# Patient Record
Sex: MALE | Race: WHITE | Employment: PART TIME | URBAN - METROPOLITAN AREA
[De-identification: names, ages, dates, MRNs, and addresses within clinical notes are randomized per-mention and may not be internally consistent; named-entity substitution may affect disease eponyms.]

---

## 2018-05-07 ENCOUNTER — OFFICE VISIT (OUTPATIENT)
Dept: NEUROLOGY | Age: 53
End: 2018-05-07

## 2018-05-07 VITALS
DIASTOLIC BLOOD PRESSURE: 86 MMHG | HEIGHT: 67 IN | WEIGHT: 194.6 LBS | SYSTOLIC BLOOD PRESSURE: 130 MMHG | BODY MASS INDEX: 30.54 KG/M2 | OXYGEN SATURATION: 94 % | HEART RATE: 98 BPM | RESPIRATION RATE: 16 BRPM

## 2018-05-07 DIAGNOSIS — T39.95XA ANALGESIC OVERUSE HEADACHE: ICD-10-CM

## 2018-05-07 DIAGNOSIS — R06.83 SNORING: ICD-10-CM

## 2018-05-07 DIAGNOSIS — R51.9 CHRONIC DAILY HEADACHE: Primary | ICD-10-CM

## 2018-05-07 DIAGNOSIS — R51.9 MORNING HEADACHE: ICD-10-CM

## 2018-05-07 DIAGNOSIS — G44.40 ANALGESIC OVERUSE HEADACHE: ICD-10-CM

## 2018-05-07 RX ORDER — CITALOPRAM 40 MG/1
40 TABLET, FILM COATED ORAL DAILY
COMMUNITY
End: 2021-04-12

## 2018-05-07 RX ORDER — PRAZOSIN HYDROCHLORIDE 2 MG/1
2 CAPSULE ORAL
COMMUNITY
End: 2021-07-23 | Stop reason: ALTCHOICE

## 2018-05-07 RX ORDER — ATORVASTATIN CALCIUM 40 MG/1
TABLET, FILM COATED ORAL DAILY
COMMUNITY

## 2018-05-07 RX ORDER — METFORMIN HYDROCHLORIDE 500 MG/1
TABLET ORAL
COMMUNITY
End: 2019-04-29

## 2018-05-07 NOTE — PATIENT INSTRUCTIONS
Today we discussed medication overuse headache. You will stop all over-the-counter medications for headache including Advil. We discussed that headaches may get worse before they get better. Allow for 8-10 weeks medication free to assess true headache pattern. In the meantime, I will place order for sleep study.   You will follow-up with our sleep doctor if study is abnormal.
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
## 2018-05-07 NOTE — MR AVS SNAPSHOT
[address] [facility] 
 
 
 [address] [facility] 
[phone] Patient: Laura Crowell Peer MRN: [id number] :1965 Visit Information Date & Time Provider Department Dept. Phone Encounter #  
 2018  9:30 AM Roni Vidal  [facility] at [address] [place] [address] [id number] Follow-up Instructions Return for 8-10 weeks, after PSG with Dr. Lisa Bahena if abnormal. Upcoming Health Maintenance Date Due DTaP/Tdap/Td series (1 - Tdap) 1987 FOBT Q 1 YEAR AGE 50-75 2016 Influenza Age 5 to Adult 2018 Allergies as of 2018  Review Complete On: 2018 By: Makeda Laird LPN No Known Allergies Current Immunizations  Reviewed on 10/16/2015 Name Date Influenza Vaccine (Quad) PF 10/16/2015  1:02 PM  
  
 Not reviewed this visit You Were Diagnosed With   
  
 Codes Comments Chronic daily headache    -  Primary ICD-10-CM: H07 ICD-9-CM: 784.0 Morning headache     ICD-10-CM: R51 ICD-9-CM: 784.0 Analgesic overuse headache     ICD-10-CM: G44.40, T39.95XA ICD-9-CM: 339.3, E935.9 Snoring     ICD-10-CM: R06.83 
ICD-9-CM: 786.09 Vitals BP Pulse Resp Height(growth percentile) Weight(growth percentile) SpO2  
 130/86 98 16 5' 7\" (1.702 m) 194 lb 9.6 oz (88.3 kg) 94% BMI Smoking Status 30.48 kg/m2 Never Smoker BMI and BSA Data Body Mass Index Body Surface Area  
 30.48 kg/m 2 2.04 m 2 Your Updated Medication List  
  
   
This list is accurate as of 18  9:39 AM.  Always use your most recent med list. AMLODIPINE BESYLATE (BULK) 5 mg. aspirin delayed-release 81 mg tablet Take 81 mg by mouth daily. citalopram 40 mg tablet Commonly known as:  Ciera Sink Take 40 mg by mouth daily. clonazePAM 2 mg tablet Commonly known as:  Waynard Lui Take 2 mg by mouth three (3) times daily. glucosamine-chondroitin 500-400 mg Cap Commonly known as:  [facility] Take 1 Cap by mouth daily. LaMICtal 200 mg tablet Generic drug:  lamoTRIgine Take 200 mg by mouth nightly. Indications: Take 2 tabs for total 400mg LATUDA 120 mg Tab tablet Generic drug:  lurasidone Take 120 mg by mouth nightly. LIPITOR 40 mg tablet Generic drug:  atorvastatin Take  by mouth daily. metFORMIN 500 mg tablet Commonly known as:  GLUCOPHAGE Take  by mouth three (3) times daily (with meals). prazosin 2 mg capsule Commonly known as:  MINIPRESS Take 2 mg by mouth nightly. REXULTI 1 mg Tab tablet Generic drug:  brexpiprazole Take  by mouth daily. therapeutic multivitamin tablet Commonly known as:  [facility] Take 1 Tab by mouth daily. VITAMIN B-12 1,000 mcg tablet Generic drug:  cyanocobalamin Take 1,000 mcg by mouth daily. VITAMIN C 500 mg tablet Generic drug:  ascorbic acid (vitamin C) Take 500 mg by mouth daily. VYVANSE 70 mg capsule Generic drug:  Lisdexamfetamine Take 70 mg by mouth every morning. WELLBUTRIN  mg SR tablet Generic drug:  buPROPion SR Take 150 mg by mouth two (2) times a day. Follow-up Instructions Return for 8-10 weeks, after PSG with Dr. Yolis Crocker if abnormal.  
  
To-Do List   
 05/07/2018 Sleep Center:  SLEEP STUDY FULL Patient Instructions Today we discussed medication overuse headache. You will stop all over-the-counter medications for headache including Advil. We discussed that headaches may get worse before they get better. Allow for 8-10 weeks medication free to assess true headache pattern. In the meantime, I will place order for sleep study. You will follow-up with our sleep doctor if study is abnormal. 
 
 
  
Introducing [facility]! Liudmila Robles introduces [employer] patient portal. Now you can access parts of your medical record, email your doctor's office, and request medication refills online. 1. In your internet browser, go to [email or web address] 2. Click on the First Time User? Click Here link in the Sign In box. You will see the New Member Sign Up page. 3. Enter your [employer] Access Code exactly as it appears below. You will not need to use this code after youve completed the sign-up process. If you do not sign up before the expiration date, you must request a new code. · [employer] Access Code: [facility] Expires: 8/5/2018  8:49 AM 
 
4. Enter the last four digits of your Social Security Number (xxxx) and Date of Birth (mm/dd/yyyy) as indicated and click Submit. You will be taken to the next sign-up page. 5. Create a [employer] ID. This will be your [employer] login ID and cannot be changed, so think of one that is secure and easy to remember. 6. Create a [employer] password. You can change your password at any time. 7. Enter your Password Reset Question and Answer. This can be used at a later time if you forget your password. 8. Enter your e-mail address. You will receive e-mail notification when new information is available in [address] 9. Click Sign Up. You can now view and download portions of your medical record. 10. Click the Download Summary menu link to download a portable copy of your medical information. If you have questions, please visit the Frequently Asked Questions section of the [employer] website. Remember, [employer] is NOT to be used for urgent needs. For medical emergencies, dial 911. Now available from your iPhone and Android! Please provide this summary of care documentation to your next provider. Your primary care clinician is listed as Debora Avila. If you have any questions after today's visit, please call [phone].
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
## 2018-10-08 ENCOUNTER — HOSPITAL ENCOUNTER (OUTPATIENT)
Dept: SLEEP MEDICINE | Age: 53
Discharge: HOME | End: 2018-10-08
Payer: SUBSIDIZED

## 2018-10-08 DIAGNOSIS — G44.40 ANALGESIC OVERUSE HEADACHE: ICD-10-CM

## 2018-10-08 DIAGNOSIS — R51.9 MORNING HEADACHE: ICD-10-CM

## 2018-10-08 DIAGNOSIS — R51.9 CHRONIC DAILY HEADACHE: ICD-10-CM

## 2018-10-08 DIAGNOSIS — T39.95XA ANALGESIC OVERUSE HEADACHE: ICD-10-CM

## 2018-10-08 DIAGNOSIS — R06.83 SNORING: ICD-10-CM

## 2018-10-08 PROCEDURE — 95810 POLYSOM 6/> YRS 4/> PARAM: CPT

## 2018-10-09 VITALS
DIASTOLIC BLOOD PRESSURE: 73 MMHG | WEIGHT: 204 LBS | HEIGHT: 66 IN | BODY MASS INDEX: 32.78 KG/M2 | SYSTOLIC BLOOD PRESSURE: 111 MMHG | HEART RATE: 88 BPM

## 2018-11-01 ENCOUNTER — TELEPHONE (OUTPATIENT)
Dept: NEUROLOGY | Age: 53
End: 2018-11-01

## 2018-11-01 NOTE — TELEPHONE ENCOUNTER
Spoke with patient verified name and  informed sleep study patient to follow up with MD read results. Spoke with PSR Vanesa to reschedule patient with Dr. Sergey Greenwood.

## 2018-11-01 NOTE — TELEPHONE ENCOUNTER
----- Message from Areli Gan NP sent at 10/24/2018  8:47 AM EDT -----  Regarding: sleep study  [facility]. He is to follow up with Dr. Leah Monreal. Please have him call and schedule a follow up for this. Thank you.

## 2019-01-03 ENCOUNTER — OFFICE VISIT (OUTPATIENT)
Dept: NEUROLOGY | Age: 54
End: 2019-01-03

## 2019-01-03 VITALS
BODY MASS INDEX: 33.59 KG/M2 | RESPIRATION RATE: 18 BRPM | HEIGHT: 67 IN | WEIGHT: 214 LBS | HEART RATE: 93 BPM | SYSTOLIC BLOOD PRESSURE: 120 MMHG | TEMPERATURE: 97.1 F | OXYGEN SATURATION: 94 % | DIASTOLIC BLOOD PRESSURE: 96 MMHG

## 2019-01-03 DIAGNOSIS — G43.019 INTRACTABLE MIGRAINE WITHOUT AURA AND WITHOUT STATUS MIGRAINOSUS: ICD-10-CM

## 2019-01-03 DIAGNOSIS — G47.33 OSA (OBSTRUCTIVE SLEEP APNEA): Primary | ICD-10-CM

## 2019-01-03 DIAGNOSIS — E66.01 MORBID OBESITY (HCC): ICD-10-CM
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
## 2019-02-19 ENCOUNTER — HOSPITAL ENCOUNTER (OUTPATIENT)
Dept: SLEEP MEDICINE | Age: 54
Discharge: HOME | End: 2019-02-19
Attending: PSYCHIATRY & NEUROLOGY
Payer: SUBSIDIZED

## 2019-02-19 DIAGNOSIS — E66.01 MORBID OBESITY (HCC): ICD-10-CM

## 2019-02-19 DIAGNOSIS — G47.33 OSA (OBSTRUCTIVE SLEEP APNEA): ICD-10-CM

## 2019-02-19 PROCEDURE — 95811 POLYSOM 6/>YRS CPAP 4/> PARM: CPT

## 2019-02-20 VITALS — SYSTOLIC BLOOD PRESSURE: 115 MMHG | HEART RATE: 86 BPM | DIASTOLIC BLOOD PRESSURE: 80 MMHG

## 2019-04-29 ENCOUNTER — OFFICE VISIT (OUTPATIENT)
Dept: NEUROLOGY | Age: 54
End: 2019-04-29

## 2019-04-29 VITALS
SYSTOLIC BLOOD PRESSURE: 130 MMHG | BODY MASS INDEX: 32.49 KG/M2 | HEIGHT: 67 IN | TEMPERATURE: 98.2 F | WEIGHT: 207 LBS | OXYGEN SATURATION: 90 % | RESPIRATION RATE: 16 BRPM | HEART RATE: 101 BPM | DIASTOLIC BLOOD PRESSURE: 100 MMHG

## 2019-04-29 DIAGNOSIS — G43.019 INTRACTABLE MIGRAINE WITHOUT AURA AND WITHOUT STATUS MIGRAINOSUS: ICD-10-CM

## 2019-04-29 DIAGNOSIS — E66.01 MORBID OBESITY (HCC): ICD-10-CM

## 2019-04-29 DIAGNOSIS — G47.33 OSA (OBSTRUCTIVE SLEEP APNEA): Primary | ICD-10-CM

## 2019-04-29 NOTE — PROGRESS NOTES
4/29/2019 10:55 AM    SSN: [id number]    Subjective:  48 y/o male coming with a CC of F/U of NIECY. I saw him in January with EDS, chronic headaches, h/o psychotrpic use contributing to his situation. He had a sleep study done in October showing an AHI of 12.2 which increased during REM sleep to 58 with desaturations down to 86%. Returned on 2/19 for a CPAP trial and did well on CPAP at 16 with a [employer] FF small mask. He brings up the fact that he initially came to see us because of headaches. He continues to have headaches 3 to 4 days out of the week at least.  They tend to be in the morning. When they occur they are quite severe, usually bitemporal, with noise sensitivity but no light sensitivity and no nausea. She has had them for 15 years. He currently takes Tylenol on a daily basis every night although he has reported for almost a year that it does not work. She had seen Valeriano Wells NP for the headaches in May 2018, it was recommended that he addressed his rebound and that he return in 8 to 10 weeks for evaluation of headache management. He went on to have this sleep study last fall and therefore he has not had any further interventions for his headaches. he has uncontrolled hypertension. He is on Vyvanse, he is not clear why, but he does have chronic depression, he sees a psychiatrist, and he is on multiple other psychotropic agents. He also has hyperlipidemia and prediabetes. He does not smoke. He has problems sleeping.         Social History     Socioeconomic History    Marital status: UNKNOWN     Spouse name: Not on file    Number of children: Not on file    Years of education: Not on file    Highest education level: Not on file   Occupational History    Not on file   Social Needs    Financial resource strain: Not on file    Food insecurity:     Worry: Not on file     Inability: Not on file    Transportation needs:     Medical: Not on file Non-medical: Not on file   Tobacco Use    Smoking status: Never Smoker    Smokeless tobacco: Never Used   Substance and Sexual Activity    Alcohol use: No    Drug use: No    Sexual activity: Not on file   Lifestyle    Physical activity:     Days per week: Not on file     Minutes per session: Not on file    Stress: Not on file   Relationships    Social connections:     Talks on phone: Not on file     Gets together: Not on file     Attends Yazidism service: Not on file     Active member of club or organization: Not on file     Attends meetings of clubs or organizations: Not on file     Relationship status: Not on file    Intimate partner violence:     Fear of current or ex partner: Not on file     Emotionally abused: Not on file     Physically abused: Not on file     Forced sexual activity: Not on file   Other Topics Concern    Not on file   Social History Narrative    Not on file       History reviewed. No pertinent family history. Current Outpatient Medications   Medication Sig Dispense Refill    prazosin (MINIPRESS) 2 mg capsule Take 2 mg by mouth nightly.  metFORMIN (GLUCOPHAGE) 500 mg tablet Take  by mouth three (3) times daily (with meals).  atorvastatin (LIPITOR) 40 mg tablet Take  by mouth daily.  brexpiprazole (REXULTI) 1 mg tab tablet Take  by mouth daily.  citalopram (CELEXA) 40 mg tablet Take 40 mg by mouth daily.  lurasidone (LATUDA) 120 mg tab tablet Take 120 mg by mouth nightly.  lamoTRIgine (LAMICTAL) 200 mg tablet Take 200 mg by mouth nightly. Indications: Take 2 tabs for total 400mg      clonazePAM (KLONOPIN) 2 mg tablet Take 2 mg by mouth three (3) times daily.  Lisdexamfetamine (VYVANSE) 70 mg capsule Take 70 mg by mouth every morning.  buPROPion SR (WELLBUTRIN SR) 150 mg SR tablet Take 150 mg by mouth two (2) times a day.  AMLODIPINE BESYLATE, BULK, 5 mg.  ascorbic acid (VITAMIN C) 500 mg tablet Take 500 mg by mouth daily.       cyanocobalamin (VITAMIN B-12) 1,000 mcg tablet Take 1,000 mcg by mouth daily.  glucosamine-chondroitin (ARTHX) 500-400 mg cap Take 1 Cap by mouth daily.  therapeutic multivitamin (THERAGRAN) tablet Take 1 Tab by mouth daily.  aspirin delayed-release 81 mg tablet Take 81 mg by mouth daily. Past Medical History:   Diagnosis Date    Headache     Hypertension     Vision decreased        Past Surgical History:   Procedure Laterality Date    HX HEENT  3/20/14    eye socket repair       No Known Allergies    Vital signs:    Visit Vitals  BP (!) 130/100 (BP 1 Location: Left arm, BP Patient Position: Sitting)   Pulse (!) 101   Temp 98.2 °F (36.8 °C) (Oral)   Resp 16   Ht 5' 7\" (1.702 m)   Wt 93.9 kg (207 lb)   SpO2 90%   BMI 32.42 kg/m²       Review of Systems:   GENERAL: Denies fever or fatigue  CARDIAC: No CP or SOB  PULMONARY: No cough of SOB  MUSCULOSKELETAL: No new joint pain  NEURO: SEE HPI      EXAM: Alert, in NAD. Heart is regular. Oriented x3, EOM's are full, PERRL, no facial asymmetries. Strength and tone are normal. DTR's +2, gait symmetric           Assessment/Plan: Mild overall NIECY, severe during REM, with comorbid intractable headaches and psychiatric disorder. Discussed study results, CPAP pitfalls, NIECY treatment options, weight management. He is motivated to try this. For this I will set him up at these settings, see him back in 2 months with a CPAP download. We also had a discussion regarding the use of Vyvanse. He is 49, he is obese, has sleep apnea, has uncontrolled hypertension and hyperlipidemia and a prediabetic state, a relatively high risk of cardiovascular disease. Coupled with headaches and insomnia which are known side effects of amphetamines, I do not think that the benefits of continuing Vyvanse outweigh its risks.   I encouraged him to have a conversation about discontinuation of this medication with his psychiatrist. I counseled pt about analgesic rebound, and explained that this must be addressed before anticipating Improvement of headaches. I advised pt to reduce intake of prn pain meds of any kind for headaches to no more than 2 days of the week. Counseled pt about sleep hygiene, including predictable bedtimes and rise times, avoidance of late meals near bedtime, no TV in bedroom, to get out of room if unable to fall asleep after 10-15 mins, and no napping. He has been complicating his medication list, which is already quite complex, with additional medications for reduction of headaches, I will see how addressing his rebound, treating his sleep apnea, and stopping the amphetamine he is taking helps his situation before considering medications such as beta-blockers, topiramate, or valproic acid. 25 out of 40 minutes spent counseling today. PLEASE NOTE:   Portions of this document may have been produced using voice recognition software. Unrecognized errors in transcription may be present. This note will not be viewable in [address]

## 2019-04-29 NOTE — PROGRESS NOTES
ROOM # David Peer presents today for   Chief Complaint   Patient presents with    Follow-up    Sleep Study       Linda Manuel preferred language for health care discussion is english/other. Depression Screening:  3 most recent PHQ Screens 1/3/2019   Little interest or pleasure in doing things Not at all   Feeling down, depressed, irritable, or hopeless Not at all   Total Score PHQ 2 0       Learning Assessment:  No flowsheet data found. Abuse Screening:  No flowsheet data found. Fall Risk  No flowsheet data found. Visit Vitals  BP (!) 130/100 (BP 1 Location: Left arm, BP Patient Position: Sitting)   Pulse (!) 101   Temp 98.2 °F (36.8 °C) (Oral)   Resp 16   Ht 5' 7\" (1.702 m)   Wt 207 lb (93.9 kg)   SpO2 90%   BMI 32.42 kg/m²       Health Maintenance reviewed and discussed per provider. Yes    Linda Manuel is due for   Health Maintenance Due   Topic Date Due    Shingrix Vaccine Age 50> (1 of 2) 04/02/2016    FOBT Q 1 YEAR AGE 50-75  04/02/2016     Please order/place referral if appropriate. Advance Directive:  1. Do you have an advance directive in place? Patient Reply: No    2. If not, would you like material regarding how to put one in place? Patient Reply: No    Coordination of Care:  1. Have you been to the ER, urgent care clinic since your last visit? Hospitalized since your last visit?  No